# Patient Record
Sex: MALE | Race: WHITE
[De-identification: names, ages, dates, MRNs, and addresses within clinical notes are randomized per-mention and may not be internally consistent; named-entity substitution may affect disease eponyms.]

---

## 2018-12-21 ENCOUNTER — HOSPITAL ENCOUNTER (EMERGENCY)
Dept: HOSPITAL 25 - UCEAST | Age: 48
Discharge: HOME | End: 2018-12-21
Payer: COMMERCIAL

## 2018-12-21 VITALS — DIASTOLIC BLOOD PRESSURE: 88 MMHG | SYSTOLIC BLOOD PRESSURE: 140 MMHG

## 2018-12-21 DIAGNOSIS — F17.200: ICD-10-CM

## 2018-12-21 DIAGNOSIS — R22.0: ICD-10-CM

## 2018-12-21 DIAGNOSIS — K04.7: Primary | ICD-10-CM

## 2018-12-21 PROCEDURE — G0463 HOSPITAL OUTPT CLINIC VISIT: HCPCS

## 2018-12-21 PROCEDURE — 99201: CPT

## 2018-12-21 NOTE — UC
Dental HPI





- HPI Summary


HPI Summary: 





Patient presents to urgent care with report of 24 hours progressive right-sided 

facial swelling.  Patient states he has a  broken tooth that previously, 8 

years ago, had a root canal.  Patient states he's been having some tooth pain 

for for 5 days.  Patient's been using over-the-counter mouthwash and Anbesol 

with little improvement.  Patient states last night which he was getting 

swollen worsened.  Patient without a drooling.  Patient returned a couple 

secretions.  Patient states a little bit discomfort in his right ear.  No 

fevers or chills.  Patient has not called his dentist came here this morning 

when he swallowed it was.  Patient does have a dentist, Dr. Fowler.  Patient is 

not inial, otherwise.  Patient without any medication allergies.  Patient 

without any other complaints.





- History of Current Complaint


Chief Complaint: UCDentalProblem


Stated Complaint: TOOTH PAIN, FACIAL SWELLING


Time Seen by Provider: 12/21/18 08:59


Pain Intensity: 2





- Allergies/Home Medications


Allergies/Adverse Reactions: 


 Allergies











Allergy/AdvReac Type Severity Reaction Status Date / Time


 


No Known Allergies Allergy   Verified 12/21/18 08:48











Home Medications: 


 Home Medications





Acetaminophen [Acetaminophen Extra Strength] 1,000 mg PO SEE INSTRUCTIONS PRN 12 /21/18 [History Confirmed 12/21/18]


diphenhydrAMINE HCl [Benadryl Allergy] 50 mg PO SEE INSTRUCTIONS 12/21/18 [

History Confirmed 12/21/18]











PMH/Surg Hx/FS Hx/Imm Hx


Previously Healthy: Yes





- Surgical History


Surgical History: Yes


Surgery Procedure, Year, and Place: root canal 7 years ago





- Family History


Known Family History: Positive: Non-Contributory





- Social History


Occupation: Employed Full-time


Alcohol Use: Occasionally


Alcohol Amount: weekends 3-4


Substance Use Type: None


Smoking Status (MU): Light Every Day Tobacco Smoker





- Immunization History


Most Recent Tetanus Shot: utd





Review of Systems


All Other Systems Reviewed And Are Negative: Yes


Constitutional: Positive: Negative


Skin: Positive: Negative


Eyes: Positive: Negative


ENT: Positive: Other - dental pain, facial swelling





Physical Exam





- Summary


Physical Exam Summary: 





Vital Signs Reviewed: Yes


A+Ox3, no distress


Eyes: Conjunctiva Clear, STEFFANY. EOM intact and full


ENT: Hearing grossly normal  TM x 2 clear, No TMJ Pain, + edema right check, 

inferior to zygomatic arch. mild warmth. no fluctuance  mild TTP  4 - broken at 

gumline - pt with multiple caries, turbinates wnl mmoist, uvula midline, no 

exudate, no erythema,


Neck: Positive: Supple


Respiratory: Positive: No respiratory distress, No accessory muscle use + CTA 

throughout  no w/r


Cardiovascular: RRR  nl s1, s2  no m/r  CBT <2  sec


abd soft + BS nt/nd  no guarding, no distension


Musculoskeletal Exam: DE LUNA x 4 without difficulty Strength Intact, ROM Intact


Neurological: Positive: Alert,  + sensation throughout


Psychological: Positive: Normal Response To Family


Skin: Positive: no rash, no ecchymosis, pt with small scabbed lesion at base of 

nose, right side  at suprior nares - does not seem related to erythemam, edema 

in gumline


Triage Information Reviewed: Yes


Vital Signs: 


 Initial Vital Signs











Temp  98.5 F   12/21/18 08:52


 


Pulse  89   12/21/18 08:52


 


Resp  18   12/21/18 08:52


 


BP  140/88   12/21/18 08:52


 


Pulse Ox  96   12/21/18 08:52














Dental Complaint Course/Dx





- Course


Course Of Treatment: Patient with 4 days of right upper dental pain in 24 hours 

of right facial swelling.  Patient without any airway compromise or edema.  

Patient can with broken #4 tooth with discomfort to palpation.  Patient with a 

scab lesion on the right lateral aspect of his nose which seems distinct and 

separate from the facial swelling.  We'll start patient on clindamycin to cover 

both dental as well as skin organisms.  Peridex swish and spit.  Patient to 

contact dentist today for prompt follow-up.  Patient with strict return 

precautions.  Patient comfortable and agreeable plan





- Differential Dx/Diagnosis


Provider Diagnosis: 


 Dental abscess, Right facial swelling








Discharge





- Sign-Out/Discharge


Documenting (check all that apply): Patient Departure


All imaging exams completed and their final reports reviewed: No Studies





- Discharge Plan


Condition: Stable


Disposition: HOME


Prescriptions: 


Chlorhexidine MW 0.12% 473ML* [Peridex Mouth Wash 0.12%*] 15 ml SWISH SPIT Q6HR 

#1 btl


Clindamycin Cap(NF) [Clindamycin Cap 300 mg Cap(NF)] 300 mg PO Q6H #30 cap


Patient Education Materials:  Dental Abscess (ED)


Forms:  *Work Release


Referrals: 


Sharath Akers MD [Primary Care Provider] - 


Additional Instructions: 


- Okay to alternate ibuprofen (Advil, Motrin)600mg  and Tylenol 1000mg every 3 

hours for pain. Take with food. Do NOT take for more than 4-5 days


-Swish and spit with prescription mouth rinse 3 times a day


-Take anitbiotics as prescribed until gone. These will likley cause diarrhea - 

eat yogurt or take pro-biotics to help with these symptoms


-Stay well hydrated -


- Contact your dentist today to schedule a follow-up appointment


- Okay to apply ice pack to side of face  - wrapped in a towel


- If you develop increased pain, difficulty swallowing, fever, swelling down 

your neck or any other concerns it is recommended you go directly to the 

emergency department





- Billing Disposition and Condition


Condition: STABLE


Disposition: Home